# Patient Record
Sex: FEMALE
[De-identification: names, ages, dates, MRNs, and addresses within clinical notes are randomized per-mention and may not be internally consistent; named-entity substitution may affect disease eponyms.]

---

## 2023-11-09 ENCOUNTER — APPOINTMENT (OUTPATIENT)
Dept: GASTROENTEROLOGY | Facility: CLINIC | Age: 58
End: 2023-11-09
Payer: COMMERCIAL

## 2023-11-09 VITALS
SYSTOLIC BLOOD PRESSURE: 115 MMHG | HEART RATE: 67 BPM | OXYGEN SATURATION: 97 % | WEIGHT: 163 LBS | HEIGHT: 65 IN | DIASTOLIC BLOOD PRESSURE: 68 MMHG | BODY MASS INDEX: 27.16 KG/M2

## 2023-11-09 DIAGNOSIS — Z12.11 ENCOUNTER FOR SCREENING FOR MALIGNANT NEOPLASM OF COLON: ICD-10-CM

## 2023-11-09 DIAGNOSIS — K92.2 GASTROINTESTINAL HEMORRHAGE, UNSPECIFIED: ICD-10-CM

## 2023-11-09 DIAGNOSIS — R19.7 DIARRHEA, UNSPECIFIED: ICD-10-CM

## 2023-11-09 DIAGNOSIS — R14.0 ABDOMINAL DISTENSION (GASEOUS): ICD-10-CM

## 2023-11-09 PROBLEM — Z00.00 ENCOUNTER FOR PREVENTIVE HEALTH EXAMINATION: Status: ACTIVE | Noted: 2023-11-09

## 2023-11-09 PROCEDURE — 99204 OFFICE O/P NEW MOD 45 MIN: CPT

## 2023-11-09 RX ORDER — SODIUM PICOSULFATE, MAGNESIUM OXIDE, AND ANHYDROUS CITRIC ACID 10; 3.5; 12 MG/160ML; G/160ML; G/160ML
10-3.5-12 MG-GM LIQUID ORAL
Qty: 1 | Refills: 0 | Status: ACTIVE | COMMUNITY
Start: 2023-11-09 | End: 1900-01-01

## 2023-11-27 ENCOUNTER — APPOINTMENT (OUTPATIENT)
Dept: ORTHOPEDIC SURGERY | Facility: CLINIC | Age: 58
End: 2023-11-27
Payer: COMMERCIAL

## 2023-11-27 VITALS — BODY MASS INDEX: 25.71 KG/M2 | HEIGHT: 66 IN | WEIGHT: 160 LBS

## 2023-11-27 DIAGNOSIS — Z78.9 OTHER SPECIFIED HEALTH STATUS: ICD-10-CM

## 2023-11-27 PROCEDURE — 72110 X-RAY EXAM L-2 SPINE 4/>VWS: CPT

## 2023-11-27 PROCEDURE — 99203 OFFICE O/P NEW LOW 30 MIN: CPT

## 2023-11-27 PROCEDURE — 72170 X-RAY EXAM OF PELVIS: CPT

## 2023-11-27 RX ORDER — SERTRALINE HYDROCHLORIDE 25 MG/1
TABLET, FILM COATED ORAL
Refills: 0 | Status: ACTIVE | COMMUNITY

## 2024-01-23 ENCOUNTER — APPOINTMENT (OUTPATIENT)
Dept: ORTHOPEDIC SURGERY | Facility: CLINIC | Age: 59
End: 2024-01-23
Payer: COMMERCIAL

## 2024-01-23 PROCEDURE — 99213 OFFICE O/P EST LOW 20 MIN: CPT

## 2024-01-23 RX ORDER — MELOXICAM 15 MG/1
15 TABLET ORAL
Qty: 30 | Refills: 1 | Status: ACTIVE | COMMUNITY
Start: 2024-01-23 | End: 1900-01-01

## 2024-01-23 NOTE — HISTORY OF PRESENT ILLNESS
[8] : 8 [Lower back] : lower back [4] : 4 [Radiating] : radiating [Sharp] : sharp [Stabbing] : stabbing [Tightness] : tightness [Tingling] : tingling [Intermittent] : intermittent [Leisure] : leisure [Sleep] : sleep [Rest] : rest [Heat] : heat [Standing] : standing [Walking] : walking [Bending forward] : bending forward [Stairs] : stairs [Extending back] : extending back [de-identified] : 01/23/2024: Patient is here today to follow up on lower back pain. Patient states that she has improved since the last visit, she still experiences pain after long periods of sitting but notes overall improvement. Less radicular type pain. Patient is currently going to PT 2x a week and it has been helping with relief. No new injuries.   11/27/2023: Patient is a 58-year-old female presenting for evaluation of 1 year of worsening lower back pain without any injury.  Pain is worse with standing, walking for prolonged time.  She rides a bike for exercise and notices pain to be worse when she is sitting up higher in the bike.  She has intermittent shocklike pains on the lateral aspect of her bilateral lower extremities into her feet.  She also complains of some pain in the bilateral buttock region.  She has been getting spasms in her lower back.  She has been trying Advil and Tylenol with some improvement.  She has not done any physical therapy in the past.  She denies any lower extremity weakness.  She denies any changes in bowel or bladder.   [] : no [FreeTextEntry1] : Hips  [FreeTextEntry7] : down into both feet  [FreeTextEntry9] : stretching

## 2024-01-23 NOTE — ASSESSMENT
[FreeTextEntry1] : 3y of lumbar back pain with what sounds like intermittent radicular symptoms that improve with rest. Bilateral lumbar paraspinal and hip abductor pain on exam. XR showing spondylolisthesis of L5 on s1. Improved some with PT  - Continue PT - Rx for Mobic to take daily as needed - Can also use tylenol - Return to biking as tolerated - Follow up in 1 month. If not improved, MRI

## 2024-01-23 NOTE — IMAGING
[Facet arthropathy] : Facet arthropathy [Spondylolithesis] : Spondylolithesis [There are no fractures, subluxations or dislocations. No significant abnormalities are seen] : There are no fractures, subluxations or dislocations. No significant abnormalities are seen [de-identified] : Lumbar Spine:  -Full range of motion without pain in all planes -Tender to palpation bilateral lumbar paraspinal muscles - Bilateral hip abductor and gluteal pain to palpation -Bilateral 5/5 lower extremity strength -Sensation intact bilateral lower extremities -Negative Straight Leg raise b/l lower extremities  [FreeTextEntry1] : L5-S1 spondylolisthesis

## 2024-01-23 NOTE — PHYSICAL EXAM
[de-identified] : Constitutional: Well developed, well nourished, able to communicate Neuro: Normal sensation, No focal deficits Skin: Intact CV: Peripheral vascular exam grossly normal Pulm: No signs of respiratory distress Psych: Oriented, normal mood and affect

## 2024-02-08 RX ORDER — SODIUM SULFATE, POTASSIUM SULFATE AND MAGNESIUM SULFATE 1.6; 3.13; 17.5 G/177ML; G/177ML; G/177ML
17.5-3.13-1.6 SOLUTION ORAL
Qty: 1 | Refills: 0 | Status: ACTIVE | COMMUNITY
Start: 2023-11-09 | End: 1900-01-01

## 2024-02-13 ENCOUNTER — APPOINTMENT (OUTPATIENT)
Dept: NUTRITION | Facility: CLINIC | Age: 59
End: 2024-02-13
Payer: COMMERCIAL

## 2024-02-13 PROCEDURE — 97802 MEDICAL NUTRITION INDIV IN: CPT | Mod: 95

## 2024-02-21 ENCOUNTER — APPOINTMENT (OUTPATIENT)
Dept: GASTROENTEROLOGY | Facility: AMBULATORY MEDICAL SERVICES | Age: 59
End: 2024-02-21
Payer: COMMERCIAL

## 2024-02-21 PROCEDURE — 45378 DIAGNOSTIC COLONOSCOPY: CPT | Mod: 33

## 2024-02-25 LAB
ALBUMIN SERPL ELPH-MCNC: 4.4 G/DL
ALP BLD-CCNC: 86 U/L
ALT SERPL-CCNC: 14 U/L
ANION GAP SERPL CALC-SCNC: 11 MMOL/L
AST SERPL-CCNC: 17 U/L
BILIRUB SERPL-MCNC: 0.3 MG/DL
BUN SERPL-MCNC: 17 MG/DL
CALCIUM SERPL-MCNC: 9.9 MG/DL
CHLORIDE SERPL-SCNC: 105 MMOL/L
CO2 SERPL-SCNC: 26 MMOL/L
CREAT SERPL-MCNC: 0.86 MG/DL
EGFR: 79 ML/MIN/1.73M2
HCT VFR BLD CALC: 37.4 %
HGB BLD-MCNC: 12.1 G/DL
MCHC RBC-ENTMCNC: 29.4 PG
MCHC RBC-ENTMCNC: 32.4 GM/DL
MCV RBC AUTO: 90.8 FL
PLATELET # BLD AUTO: 188 K/UL
POTASSIUM SERPL-SCNC: 4.9 MMOL/L
PROT SERPL-MCNC: 6.4 G/DL
RBC # BLD: 4.12 M/UL
RBC # FLD: 12.6 %
SODIUM SERPL-SCNC: 142 MMOL/L
WBC # FLD AUTO: 4.44 K/UL

## 2024-02-26 ENCOUNTER — TRANSCRIPTION ENCOUNTER (OUTPATIENT)
Age: 59
End: 2024-02-26

## 2024-02-26 LAB
IGA SER QL IEP: 98 MG/DL
TTG IGA SER IA-ACNC: <1.2 U/ML
TTG IGA SER-ACNC: NEGATIVE
TTG IGG SER IA-ACNC: 1.2 U/ML
TTG IGG SER IA-ACNC: NEGATIVE

## 2024-02-27 ENCOUNTER — NON-APPOINTMENT (OUTPATIENT)
Age: 59
End: 2024-02-27

## 2024-02-27 LAB
ENDOMYSIUM IGA SER QL: NEGATIVE
ENDOMYSIUM IGA TITR SER: NORMAL
H PYLORI AG STL QL: POSITIVE

## 2024-02-28 RX ORDER — OMEPRAZOLE 20 MG/1
20 CAPSULE, DELAYED RELEASE ORAL
Qty: 28 | Refills: 0 | Status: ACTIVE | COMMUNITY
Start: 2024-02-28 | End: 1900-01-01

## 2024-02-28 RX ORDER — AMOXICILLIN 500 MG/1
500 TABLET, FILM COATED ORAL TWICE DAILY
Qty: 56 | Refills: 0 | Status: ACTIVE | COMMUNITY
Start: 2024-02-28 | End: 1900-01-01

## 2024-02-28 RX ORDER — METRONIDAZOLE 500 MG/1
500 TABLET ORAL
Qty: 42 | Refills: 0 | Status: ACTIVE | COMMUNITY
Start: 2024-02-28 | End: 1900-01-01

## 2024-03-05 ENCOUNTER — APPOINTMENT (OUTPATIENT)
Dept: ORTHOPEDIC SURGERY | Facility: CLINIC | Age: 59
End: 2024-03-05

## 2024-03-08 ENCOUNTER — APPOINTMENT (OUTPATIENT)
Dept: ORTHOPEDIC SURGERY | Facility: CLINIC | Age: 59
End: 2024-03-08
Payer: COMMERCIAL

## 2024-03-08 PROCEDURE — 99214 OFFICE O/P EST MOD 30 MIN: CPT

## 2024-03-09 NOTE — IMAGING
[Facet arthropathy] : Facet arthropathy [Spondylolithesis] : Spondylolithesis [There are no fractures, subluxations or dislocations. No significant abnormalities are seen] : There are no fractures, subluxations or dislocations. No significant abnormalities are seen [de-identified] : Back: - No obvious deformity - No pain with palpation of spinous processes or paraspinal musculature - No pain with SI palpation  - ROM intact throughout flexion, extension, sidebending, and rotation. Pain with sidebending R and forward flexion - 5/5 strength throughout LE evaluation bilaterally. Pain with R hip resisted hip abduction and extension  - No pain with LLOYD - Negative straight leg raise bilaterally - Distally neurovascularly intact  [FreeTextEntry1] : L5-S1 spondylolisthesis

## 2024-03-09 NOTE — HISTORY OF PRESENT ILLNESS
[Lower back] : lower back [4] : 4 [8] : 8 [Radiating] : radiating [Sharp] : sharp [Tightness] : tightness [Stabbing] : stabbing [Tingling] : tingling [Intermittent] : intermittent [Leisure] : leisure [Sleep] : sleep [Rest] : rest [Heat] : heat [Walking] : walking [Standing] : standing [Bending forward] : bending forward [Extending back] : extending back [Stairs] : stairs [de-identified] : 3/8/2024: Patient is here to follow up on lower back. Patient notes that she has pain in the glutes/hips during prolonged periods of being on her feet. Pain is greater on the R side. Patient has done physical therapy and returned to biking without improvement.   01/23/2024: Patient is here today to follow up on lower back pain. Patient states that she has improved since the last visit, she still experiences pain after long periods of sitting but notes overall improvement. Less radicular type pain. Patient is currently going to PT 2x a week and it has been helping with relief. No new injuries.   11/27/2023: Patient is a 58-year-old female presenting for evaluation of 1 year of worsening lower back pain without any injury.  Pain is worse with standing, walking for prolonged time.  She rides a bike for exercise and notices pain to be worse when she is sitting up higher in the bike.  She has intermittent shocklike pains on the lateral aspect of her bilateral lower extremities into her feet.  She also complains of some pain in the bilateral buttock region.  She has been getting spasms in her lower back.  She has been trying Advil and Tylenol with some improvement.  She has not done any physical therapy in the past.  She denies any lower extremity weakness.  She denies any changes in bowel or bladder.    pain with resitserd hip extension R and hip abduction.  [FreeTextEntry1] : Hips  [FreeTextEntry7] : down into both feet  [] : no [FreeTextEntry9] : stretching

## 2024-03-09 NOTE — ASSESSMENT
[FreeTextEntry1] : 1.5y now of lumbar back pain with what sounds like intermittent radicular symptoms that improve with rest. Bilateral lumbar paraspinal and hip abductor pain on exam, R > L. XR showing spondylolisthesis of L5 on s1. No longer improving with PT. Feeling back fatigue and R glut pain with short walks - MRI R hip to rule out tendinopathy causing these prolonged symptoms that have not improved much with over 6w of PT - MRI Lumbar spine to evaluate for radicular cause of prolonged symptoms that have not improved much with over 6w of PT - Rx for Mobic to take daily as needed - Can also use tylenol - Continue PT and exercise as tolerated - Follow up after MRI, can consider pain referral depending on results of MRI

## 2024-03-09 NOTE — PHYSICAL EXAM
[de-identified] : Constitutional: Well developed, well nourished, able to communicate Neuro: Normal sensation, No focal deficits Skin: Intact CV: Peripheral vascular exam grossly normal Pulm: No signs of respiratory distress Psych: Oriented, normal mood and affect

## 2024-03-20 ENCOUNTER — APPOINTMENT (OUTPATIENT)
Dept: MRI IMAGING | Facility: CLINIC | Age: 59
End: 2024-03-20
Payer: COMMERCIAL

## 2024-03-20 PROCEDURE — 73721 MRI JNT OF LWR EXTRE W/O DYE: CPT | Mod: RT

## 2024-03-20 PROCEDURE — 72148 MRI LUMBAR SPINE W/O DYE: CPT

## 2024-03-29 ENCOUNTER — APPOINTMENT (OUTPATIENT)
Dept: ORTHOPEDIC SURGERY | Facility: CLINIC | Age: 59
End: 2024-03-29
Payer: COMMERCIAL

## 2024-03-29 PROCEDURE — 99213 OFFICE O/P EST LOW 20 MIN: CPT

## 2024-03-29 NOTE — ASSESSMENT
[FreeTextEntry1] : 1.5y now of lumbar back pain with what sounds like intermittent radicular symptoms that improve with rest. Bilateral lumbar paraspinal and hip abductor pain on exam, R > L. XR showing spondylolisthesis of L5 on s1. No longer improving with PT. Feeling back fatigue and R glut pain with short walks. MRI R hip showing mild osteoarthritic changes and mild greater troch tendinopathy. MRI Lumbar spine showing spondylolisthesis of L5-S1 with neuroforaminal narrowing which is likely contributing more with her pain. - Discussed referral to PMR or to Spine to discuss options as her symptoms seem to be more back related. Possible LESI vs discussion of fusion given persistent symptoms without PT improvement - Rx for Mobic to take daily as needed - Can also use tylenol - Continue PT and exercise as tolerated - Follow up as needed

## 2024-03-29 NOTE — HISTORY OF PRESENT ILLNESS
[Lower back] : lower back [8] : 8 [4] : 4 [Radiating] : radiating [Sharp] : sharp [Stabbing] : stabbing [Tightness] : tightness [Tingling] : tingling [Intermittent] : intermittent [Leisure] : leisure [Sleep] : sleep [Rest] : rest [Heat] : heat [Standing] : standing [Walking] : walking [Bending forward] : bending forward [Extending back] : extending back [Stairs] : stairs [de-identified] :  03/29/2024: Presenting for follow-up after obtaining MRI of the right hip and lumbar spine.  States pain presentation is similar.  States gluteal pain with exercise and biking.  She is becoming frustrated with her symptoms  3/8/2024: Patient is here to follow up on lower back. Patient notes that she has pain in the glutes/hips during prolonged periods of being on her feet. Pain is greater on the R side. Patient has done physical therapy and returned to biking without improvement.   01/23/2024: Patient is here today to follow up on lower back pain. Patient states that she has improved since the last visit, she still experiences pain after long periods of sitting but notes overall improvement. Less radicular type pain. Patient is currently going to PT 2x a week and it has been helping with relief. No new injuries.   11/27/2023: Patient is a 58-year-old female presenting for evaluation of 1 year of worsening lower back pain without any injury.  Pain is worse with standing, walking for prolonged time.  She rides a bike for exercise and notices pain to be worse when she is sitting up higher in the bike.  She has intermittent shocklike pains on the lateral aspect of her bilateral lower extremities into her feet.  She also complains of some pain in the bilateral buttock region.  She has been getting spasms in her lower back.  She has been trying Advil and Tylenol with some improvement.  She has not done any physical therapy in the past.  She denies any lower extremity weakness.  She denies any changes in bowel or bladder. [] : no [FreeTextEntry1] : Hips  [FreeTextEntry7] : down into both feet  [FreeTextEntry9] : stretching

## 2024-03-29 NOTE — DATA REVIEWED
[Right] : of the right [Hip] : hip [MRI] : MRI [Lumbar Spine] : lumbar spine [Report was reviewed and noted in the chart] : The report was reviewed and noted in the chart [I reviewed the films/CD and agree] : I reviewed the films/CD and agree

## 2024-03-29 NOTE — PHYSICAL EXAM
[de-identified] : Constitutional: Well developed, well nourished, able to communicate Neuro: Normal sensation, No focal deficits Skin: Intact CV: Peripheral vascular exam grossly normal Pulm: No signs of respiratory distress Psych: Oriented, normal mood and affect

## 2024-04-15 ENCOUNTER — NON-APPOINTMENT (OUTPATIENT)
Age: 59
End: 2024-04-15

## 2024-04-15 LAB — H PYLORI AG STL QL: NEGATIVE

## 2024-05-02 ENCOUNTER — APPOINTMENT (OUTPATIENT)
Dept: ORTHOPEDIC SURGERY | Facility: CLINIC | Age: 59
End: 2024-05-02
Payer: COMMERCIAL

## 2024-05-02 PROCEDURE — 99205 OFFICE O/P NEW HI 60 MIN: CPT

## 2024-05-02 RX ORDER — GABAPENTIN 100 MG/1
100 CAPSULE ORAL
Qty: 60 | Refills: 2 | Status: ACTIVE | COMMUNITY
Start: 2024-05-02 | End: 1900-01-01

## 2024-05-02 NOTE — HISTORY OF PRESENT ILLNESS
[de-identified] : Pt seen by non-spine partners in the past  3/20/24 Lumbar MRI  - report noted in chart.   Ind. review- Gr 1 L5/S1 with severe b/l NF stenosis ==================================================================== 05/02/2024: AYSE MARTIN is a 59 year y/o F here for evaluation of their lower back. Patient has had lower back pain for the past 1.5 year radiating down to bilateral feet, R > L. Patient reports numbness going down BLE, R > L. Patient has done physical therapy but notes hasn't relieved pain. Patient notes pain associated with walking and standing up. No bb dysfunction. Tried NSAIDs w/o significant relief. Symptoms worsened when she started cycling more, leaning forward.

## 2024-05-02 NOTE — IMAGING
[de-identified] : [facility]SPINE Inspection: No rash or ecchymosis Palpation: No tenderness to palpation or spasm in bilateral thoracic and lumbar paraspinal musculature, no SI joint tenderness to palpation ROM: Full with pain Strength: 5/5 bilateral hip flexors, knee extensors, ankle dorsiflexors, EHL, ankle plantarflexors Sensation: Sensation present to light touch bilateral L2-S1 distributions Provocative maneuvers: + bilateral straight leg raise  Bilateral hips- Palpation: No tenderness to palpation over greater trochanter or IT band ROM: No pain with flexion and internal rotation [Disc space narrowing] : Disc space narrowing [Fracture] : Fracture [Spondylolithesis] : Spondylolithesis [Spondylolysis] : Spondylolysis [AP] : anteroposterior [There are no fractures, subluxations or dislocations. No significant abnormalities are seen] : There are no fractures, subluxations or dislocations. No significant abnormalities are seen

## 2024-05-02 NOTE — ASSESSMENT
[FreeTextEntry1] : Gr 1 L5/S1 with severe b/l NF stenosis Discussed indications for fusion   Gabapentin- Patient advised of sedating effects, instructed not to drive, operate machinery, or take with other sedating medications. Advised of need to taper on/off medication and risk of abruptly stopping gabapentin.  Pain c/s

## 2024-05-06 PROBLEM — M43.17 SPONDYLOLISTHESIS OF LUMBOSACRAL REGION: Status: ACTIVE | Noted: 2023-11-27

## 2024-05-06 PROBLEM — M54.16 LUMBAR RADICULOPATHY, ACUTE: Status: ACTIVE | Noted: 2023-11-27

## 2024-05-09 ENCOUNTER — APPOINTMENT (OUTPATIENT)
Dept: PAIN MANAGEMENT | Facility: CLINIC | Age: 59
End: 2024-05-09
Payer: COMMERCIAL

## 2024-05-09 VITALS — HEIGHT: 66 IN | BODY MASS INDEX: 26.84 KG/M2 | WEIGHT: 167 LBS

## 2024-05-09 DIAGNOSIS — Z80.9 FAMILY HISTORY OF MALIGNANT NEOPLASM, UNSPECIFIED: ICD-10-CM

## 2024-05-09 DIAGNOSIS — M67.951 UNSPECIFIED DISORDER OF SYNOVIUM AND TENDON, RIGHT THIGH: ICD-10-CM

## 2024-05-09 DIAGNOSIS — M43.17 SPONDYLOLISTHESIS, LUMBOSACRAL REGION: ICD-10-CM

## 2024-05-09 DIAGNOSIS — Z87.891 PERSONAL HISTORY OF NICOTINE DEPENDENCE: ICD-10-CM

## 2024-05-09 DIAGNOSIS — M54.16 RADICULOPATHY, LUMBAR REGION: ICD-10-CM

## 2024-05-09 PROCEDURE — 99204 OFFICE O/P NEW MOD 45 MIN: CPT

## 2024-05-09 NOTE — PHYSICAL EXAM
[facility]: Preventive Care And Screening: Influenza Immunization: Influenza Immunization previously received during influenza season Detail Level: Simple [de-identified] : Gen: NAD Head: NC/AT Eyes: no glasses, no scleral icterus ENT: mucous membranes moist CV: No JVD Lungs: nonlabored breathing Abd: soft, NT/ND Ext: full ROM in all extremities, no peripheral edema; +TTP over the RIGHT greater trochanter Back: +TTP in the bilateral low lumbar facet region, full ROM with flexion/extension, +facet loading on the RIGHT; +SLR on the right Neuro: CN intact LEs +5 L +5 R hip flexion +5 L +5 R leg extension +5 L +5 R leg flexion +5 L +5 R foot dorsiflexion +5 L +5 R foot plantarflexion +5 L +5 R EHL extension Psych: normal affect Skin: no visible lesions

## 2024-05-09 NOTE — HISTORY OF PRESENT ILLNESS
[Lower back] : lower back [Buttock] : buttock [Right Leg] : right leg [6] : 6 [3] : 3 [Dull/Aching] : dull/aching [Sharp] : sharp [Tightness] : tightness [Tingling] : tingling [Intermittent] : intermittent [Household chores] : household chores [Leisure] : leisure [Work] : work [Sleep] : sleep [Rest] : rest [Meds] : meds [Ice] : ice [Physical therapy] : physical therapy [Standing] : standing [Walking] : walking [FreeTextEntry1] : 5/9/2024: AYSE MARTIN is a 59 year-old woman presenting for a NPV for a history of chronic low back pain. The patient notes a 1-year history of pain in the low back. She had previous back problems 12 years ago that improved with PT. However, over the past year she started having pain when riding her bicycle for longer distances and up and down hills. At this point, pain is across the low lumbar region with radiation to the bilateral posterior thighs and posterior legs. She notes intermittent tingling and numbness. No weakness. Pain worse with ambulating for more than a few blocks as well as standing for long periods.  The patient states that average pain over the past week was 5/10 in severity. Mood: Patient notes some depression and anxiety. She takes sertraline. Sleep: No difficulty with sleep initiation or maintenance 2/2 pain.  [] : no [FreeTextEntry6] : numbness and tingling in feet [FreeTextEntry7] : R leg  [FreeTextEntry9] : Gabapentin medication helping with pain  [de-identified] : x-ray and MRI

## 2024-05-09 NOTE — DATA REVIEWED
[MRI] : MRI [Lumbar Spine] : lumbar spine [Report was reviewed and noted in the chart] : The report was reviewed and noted in the chart [I independently reviewed and interpreted images and report] : I independently reviewed and interpreted images and report [FreeTextEntry1] : 3/20/2024: L4-L5: broad disc bulge, facet hypertrophy, ligamentum flavum hypertrophy, facet effusion L5-S1: Grade I anterolisthesis, loss of disc signal and height; broad disc bulge, facet hypertrophy, ligamentum flavum hypertrophy w/ bilateral NF stenosis; broad pseudo herniation

## 2024-05-09 NOTE — DISCUSSION/SUMMARY
[de-identified] : AYSE MARTIN is a 59 year-old woman presenting for a NPV for a history of chronic low back pain.   Prior treatment: Gabapentin Physical therapy x4 months Meloxicam  Plan: 1) MRI lumbar spine images reviewed with the patient. 2) Discussed L5-S1 ILESI w/o MAC. The procedure was explained to the patient in detail. Reviewed risks, benefits, and alternatives with the patient. Some risks discussed included temporary increase in pain, bleeding, infection, and side effects from steroids. The patient expressed understanding and would like to defer. MAY CALL to schedule.  3) Continue physical therapy 4) Continue gabapentin 100mg qhs; denies AEs at this time 5) Continue home exercise regimen 6) RTC 6 weeks or prn post procedure

## 2024-07-09 ENCOUNTER — APPOINTMENT (OUTPATIENT)
Dept: PAIN MANAGEMENT | Facility: CLINIC | Age: 59
End: 2024-07-09
Payer: COMMERCIAL

## 2024-07-09 DIAGNOSIS — M54.16 RADICULOPATHY, LUMBAR REGION: ICD-10-CM

## 2024-07-09 DIAGNOSIS — M43.17 SPONDYLOLISTHESIS, LUMBOSACRAL REGION: ICD-10-CM

## 2024-07-09 PROCEDURE — 62323 NJX INTERLAMINAR LMBR/SAC: CPT

## 2024-07-11 ENCOUNTER — APPOINTMENT (OUTPATIENT)
Dept: ORTHOPEDIC SURGERY | Facility: CLINIC | Age: 59
End: 2024-07-11

## 2024-07-25 ENCOUNTER — APPOINTMENT (OUTPATIENT)
Dept: PAIN MANAGEMENT | Facility: CLINIC | Age: 59
End: 2024-07-25

## 2025-06-30 ENCOUNTER — APPOINTMENT (OUTPATIENT)
Dept: CARDIOLOGY | Facility: CLINIC | Age: 60
End: 2025-06-30
Payer: COMMERCIAL

## 2025-06-30 ENCOUNTER — RESULT REVIEW (OUTPATIENT)
Age: 60
End: 2025-06-30

## 2025-06-30 VITALS
SYSTOLIC BLOOD PRESSURE: 112 MMHG | OXYGEN SATURATION: 99 % | DIASTOLIC BLOOD PRESSURE: 72 MMHG | WEIGHT: 170 LBS | HEIGHT: 66 IN | BODY MASS INDEX: 27.32 KG/M2 | RESPIRATION RATE: 16 BRPM | HEART RATE: 65 BPM | TEMPERATURE: 98 F

## 2025-06-30 PROBLEM — Z87.891 FORMER SMOKER: Status: ACTIVE | Noted: 2025-06-30

## 2025-06-30 PROBLEM — Z87.09 HISTORY OF ASTHMA: Status: RESOLVED | Noted: 2025-06-30 | Resolved: 2025-06-30

## 2025-06-30 PROCEDURE — 93306 TTE W/DOPPLER COMPLETE: CPT

## 2025-06-30 PROCEDURE — 93000 ELECTROCARDIOGRAM COMPLETE: CPT

## 2025-06-30 PROCEDURE — 99203 OFFICE O/P NEW LOW 30 MIN: CPT

## 2025-07-17 ENCOUNTER — APPOINTMENT (OUTPATIENT)
Dept: CARDIOLOGY | Facility: CLINIC | Age: 60
End: 2025-07-17
Payer: COMMERCIAL

## 2025-07-17 PROBLEM — I20.9 ANGINA, CLASS II: Status: ACTIVE | Noted: 2025-07-17

## 2025-07-17 PROCEDURE — 93015 CV STRESS TEST SUPVJ I&R: CPT

## 2025-07-17 PROCEDURE — ZZZZZ: CPT

## 2025-07-17 RX ORDER — ASPIRIN 81 MG/1
81 TABLET, COATED ORAL
Qty: 90 | Refills: 1 | Status: ACTIVE | COMMUNITY
Start: 2025-07-17

## 2025-07-18 ENCOUNTER — APPOINTMENT (OUTPATIENT)
Dept: CARDIOLOGY | Facility: CLINIC | Age: 60
End: 2025-07-18
Payer: COMMERCIAL

## 2025-07-18 VITALS
DIASTOLIC BLOOD PRESSURE: 78 MMHG | SYSTOLIC BLOOD PRESSURE: 118 MMHG | BODY MASS INDEX: 27 KG/M2 | TEMPERATURE: 98.1 F | RESPIRATION RATE: 16 BRPM | OXYGEN SATURATION: 98 % | HEART RATE: 68 BPM | HEIGHT: 66 IN | WEIGHT: 168 LBS

## 2025-07-18 PROBLEM — R06.02 SHORTNESS OF BREATH ON EXERTION: Status: ACTIVE | Noted: 2025-06-30

## 2025-07-18 PROBLEM — R07.89 CHEST TIGHTNESS: Status: ACTIVE | Noted: 2025-07-18

## 2025-07-18 PROBLEM — R94.39 ABNORMAL STRESS TEST: Status: ACTIVE | Noted: 2025-07-18

## 2025-07-18 PROCEDURE — 99214 OFFICE O/P EST MOD 30 MIN: CPT

## 2025-07-18 RX ORDER — NITROGLYCERIN 0.4 MG/1
0.4 TABLET SUBLINGUAL
Qty: 25 | Refills: 2 | Status: ACTIVE | COMMUNITY
Start: 2025-07-18 | End: 1900-01-01

## 2025-07-19 ENCOUNTER — LABORATORY RESULT (OUTPATIENT)
Age: 60
End: 2025-07-19

## 2025-07-22 ENCOUNTER — OUTPATIENT (OUTPATIENT)
Dept: OUTPATIENT SERVICES | Facility: HOSPITAL | Age: 60
LOS: 1 days | End: 2025-07-22
Payer: COMMERCIAL

## 2025-07-22 ENCOUNTER — TRANSCRIPTION ENCOUNTER (OUTPATIENT)
Age: 60
End: 2025-07-22

## 2025-07-22 VITALS
SYSTOLIC BLOOD PRESSURE: 100 MMHG | RESPIRATION RATE: 13 BRPM | DIASTOLIC BLOOD PRESSURE: 55 MMHG | OXYGEN SATURATION: 99 % | HEART RATE: 65 BPM

## 2025-07-22 VITALS
TEMPERATURE: 98 F | WEIGHT: 166.89 LBS | SYSTOLIC BLOOD PRESSURE: 115 MMHG | HEART RATE: 60 BPM | RESPIRATION RATE: 14 BRPM | HEIGHT: 66 IN | DIASTOLIC BLOOD PRESSURE: 67 MMHG

## 2025-07-22 DIAGNOSIS — Z98.891 HISTORY OF UTERINE SCAR FROM PREVIOUS SURGERY: Chronic | ICD-10-CM

## 2025-07-22 LAB
ANION GAP SERPL CALC-SCNC: 14 MMOL/L — SIGNIFICANT CHANGE UP (ref 7–14)
BUN SERPL-MCNC: 18 MG/DL — SIGNIFICANT CHANGE UP (ref 7–23)
CALCIUM SERPL-MCNC: 9.3 MG/DL — SIGNIFICANT CHANGE UP (ref 8.4–10.5)
CHLORIDE SERPL-SCNC: 100 MMOL/L — SIGNIFICANT CHANGE UP (ref 98–107)
CO2 SERPL-SCNC: 22 MMOL/L — SIGNIFICANT CHANGE UP (ref 22–31)
CREAT SERPL-MCNC: 0.74 MG/DL — SIGNIFICANT CHANGE UP (ref 0.5–1.3)
EGFR: 94 ML/MIN/1.73M2 — SIGNIFICANT CHANGE UP
EGFR: 94 ML/MIN/1.73M2 — SIGNIFICANT CHANGE UP
GLUCOSE SERPL-MCNC: 92 MG/DL — SIGNIFICANT CHANGE UP (ref 70–99)
HCT VFR BLD CALC: 38.5 % — SIGNIFICANT CHANGE UP (ref 34.5–45)
HGB BLD-MCNC: 12.7 G/DL — SIGNIFICANT CHANGE UP (ref 11.5–15.5)
MAGNESIUM SERPL-MCNC: 2.1 MG/DL — SIGNIFICANT CHANGE UP (ref 1.6–2.6)
MCHC RBC-ENTMCNC: 29.8 PG — SIGNIFICANT CHANGE UP (ref 27–34)
MCHC RBC-ENTMCNC: 33 G/DL — SIGNIFICANT CHANGE UP (ref 32–36)
MCV RBC AUTO: 90.4 FL — SIGNIFICANT CHANGE UP (ref 80–100)
NRBC # BLD AUTO: 0 K/UL — SIGNIFICANT CHANGE UP (ref 0–0)
NRBC # FLD: 0 K/UL — SIGNIFICANT CHANGE UP (ref 0–0)
NRBC BLD AUTO-RTO: 0 /100 WBCS — SIGNIFICANT CHANGE UP (ref 0–0)
PLATELET # BLD AUTO: 204 K/UL — SIGNIFICANT CHANGE UP (ref 150–400)
PMV BLD: 11.8 FL — SIGNIFICANT CHANGE UP (ref 7–13)
POTASSIUM SERPL-MCNC: 4.2 MMOL/L — SIGNIFICANT CHANGE UP (ref 3.5–5.3)
POTASSIUM SERPL-SCNC: 4.2 MMOL/L — SIGNIFICANT CHANGE UP (ref 3.5–5.3)
RBC # BLD: 4.26 M/UL — SIGNIFICANT CHANGE UP (ref 3.8–5.2)
RBC # FLD: 12.6 % — SIGNIFICANT CHANGE UP (ref 10.3–14.5)
SODIUM SERPL-SCNC: 136 MMOL/L — SIGNIFICANT CHANGE UP (ref 135–145)
WBC # BLD: 6.15 K/UL — SIGNIFICANT CHANGE UP (ref 3.8–10.5)
WBC # FLD AUTO: 6.15 K/UL — SIGNIFICANT CHANGE UP (ref 3.8–10.5)

## 2025-07-22 PROCEDURE — 99152 MOD SED SAME PHYS/QHP 5/>YRS: CPT

## 2025-07-22 PROCEDURE — 93454 CORONARY ARTERY ANGIO S&I: CPT | Mod: 26

## 2025-07-22 PROCEDURE — 93010 ELECTROCARDIOGRAM REPORT: CPT

## 2025-07-22 RX ORDER — SERTRALINE 100 MG/1
1 TABLET, FILM COATED ORAL
Refills: 0 | DISCHARGE

## 2025-07-22 RX ORDER — ASPIRIN 325 MG
1 TABLET ORAL
Refills: 0 | DISCHARGE